# Patient Record
Sex: FEMALE | Race: BLACK OR AFRICAN AMERICAN | Employment: UNEMPLOYED | URBAN - METROPOLITAN AREA
[De-identification: names, ages, dates, MRNs, and addresses within clinical notes are randomized per-mention and may not be internally consistent; named-entity substitution may affect disease eponyms.]

---

## 2022-09-21 ENCOUNTER — OFFICE VISIT (OUTPATIENT)
Dept: FAMILY MEDICINE CLINIC | Age: 32
End: 2022-09-21
Payer: COMMERCIAL

## 2022-09-21 ENCOUNTER — HOSPITAL ENCOUNTER (OUTPATIENT)
Facility: CLINIC | Age: 32
Discharge: HOME | End: 2022-09-21
Payer: COMMERCIAL

## 2022-09-21 VITALS — HEART RATE: 80 BPM | HEIGHT: 65 IN | WEIGHT: 180 LBS | OXYGEN SATURATION: 95 % | BODY MASS INDEX: 29.99 KG/M2

## 2022-09-21 DIAGNOSIS — R10.2 PELVIC CRAMPING: ICD-10-CM

## 2022-09-21 DIAGNOSIS — Z32.00 POSSIBLE PREGNANCY, NOT CONFIRMED: ICD-10-CM

## 2022-09-21 DIAGNOSIS — Z76.89 ENCOUNTER TO ESTABLISH CARE: Primary | ICD-10-CM

## 2022-09-21 DIAGNOSIS — N91.2 AMENORRHEA: ICD-10-CM

## 2022-09-21 LAB
HCG QUALITATIVE: NEGATIVE
HCG QUANTITATIVE: <1 MIU/ML

## 2022-09-21 PROCEDURE — 36415 COLL VENOUS BLD VENIPUNCTURE: CPT

## 2022-09-21 PROCEDURE — 84702 CHORIONIC GONADOTROPIN TEST: CPT

## 2022-09-21 PROCEDURE — 84703 CHORIONIC GONADOTROPIN ASSAY: CPT

## 2022-09-21 PROCEDURE — 99204 OFFICE O/P NEW MOD 45 MIN: CPT

## 2022-09-21 RX ORDER — PNV NO.95/FERROUS FUM/FOLIC AC 28MG-0.8MG
1 TABLET ORAL DAILY
Qty: 90 TABLET | Refills: 2 | Status: SHIPPED | OUTPATIENT
Start: 2022-09-21 | End: 2022-12-20

## 2022-09-21 RX ORDER — IBUPROFEN 800 MG/1
800 TABLET ORAL EVERY 6 HOURS PRN
COMMUNITY

## 2022-09-21 ASSESSMENT — PATIENT HEALTH QUESTIONNAIRE - PHQ9
SUM OF ALL RESPONSES TO PHQ9 QUESTIONS 1 & 2: 0
SUM OF ALL RESPONSES TO PHQ QUESTIONS 1-9: 0
SUM OF ALL RESPONSES TO PHQ QUESTIONS 1-9: 0
2. FEELING DOWN, DEPRESSED OR HOPELESS: 0
1. LITTLE INTEREST OR PLEASURE IN DOING THINGS: 0
SUM OF ALL RESPONSES TO PHQ QUESTIONS 1-9: 0
SUM OF ALL RESPONSES TO PHQ QUESTIONS 1-9: 0

## 2022-09-21 ASSESSMENT — ENCOUNTER SYMPTOMS
EYE ITCHING: 0
COUGH: 0
SHORTNESS OF BREATH: 0
EYE DISCHARGE: 0
NAUSEA: 1

## 2022-09-21 ASSESSMENT — ANXIETY QUESTIONNAIRES
7. FEELING AFRAID AS IF SOMETHING AWFUL MIGHT HAPPEN: 0
5. BEING SO RESTLESS THAT IT IS HARD TO SIT STILL: 0
4. TROUBLE RELAXING: 0
GAD7 TOTAL SCORE: 0
3. WORRYING TOO MUCH ABOUT DIFFERENT THINGS: 0
6. BECOMING EASILY ANNOYED OR IRRITABLE: 0
2. NOT BEING ABLE TO STOP OR CONTROL WORRYING: 0
1. FEELING NERVOUS, ANXIOUS, OR ON EDGE: 0

## 2022-09-21 NOTE — PROGRESS NOTES
Nick Castro (:  1990) is a 34 y.o. female,New patient, here for evaluation of the following chief complaint(s):  Establish Care and Dysmenorrhea         ASSESSMENT/PLAN:  1. Encounter to establish care  2. Amenorrhea  -     POCT urine pregnancy  -     hCG, Quantitative, Pregnancy; Future  -     External Referral To Ob-Gyn  -     US PELVIS COMPLETE; Future  -     hCG, Serum, Qualitative; Future  3. Possible pregnancy, not confirmed  -     POCT urine pregnancy  -     hCG, Quantitative, Pregnancy; Future  -     External Referral To Ob-Gyn  -     Prenatal Vit-Fe Fumarate-FA (PRENATAL VITAMIN) 27-0.8 MG TABS; Take 1 tablet by mouth daily, Disp-90 tablet, R-2Normal  -     US PELVIS COMPLETE; Future  -     hCG, Serum, Qualitative; Future  4. Pelvic cramping  -     POCT urine pregnancy  -     hCG, Quantitative, Pregnancy; Future  -     External Referral To Ob-Gyn  -     US PELVIS COMPLETE; Future  -     hCG, Serum, Qualitative; Future    POC preg negative in office. Complete labs today. Encouraged stat US today. Patient unable to complete pelvic US stat due to evening plans. Patient aware that if any increase in abdominal pain or abrupt heavy bleeding to report to ER. Patient to call Dr Pino Monae ([facility] referral in [place] due to patient location) and establish ASAP. Complete labs as ordered. Return in about 2 months (around 2022) for physical .         Subjective   SUBJECTIVE/OBJECTIVE:  Patient here to establish care. She recently moved to the area from [facility] after her spouse and her did online dating, and are now . She thinks she may be pregnant. LMP: . She has taken several OTC test.  Some which have been positive and others that have been negative. She was having some breast tenderness and nausea. The breast tenderness has subsided, but the nausea is still persistent and she feels \"crampy\". She has never been pregnant before. She has not had any spotting.   She was not on any OCP or BC. This would be a planned and welcomed pregnancy. The last +test she had was Aug 3rd. Most recent at home was negative 2 weeks ago. She did have a PAP in June in [facility], that was normal. She is in a monogamous heterosexual relationship with her . She denies any previous medical history. States she has been taking a prenatal OTC. Review of Systems   Constitutional:  Negative for activity change, fatigue and fever. HENT:  Negative for congestion and dental problem. Eyes:  Negative for discharge and itching. Respiratory:  Negative for cough and shortness of breath. Cardiovascular:  Negative for chest pain and palpitations. Gastrointestinal:  Positive for nausea. Endocrine: Negative for cold intolerance and heat intolerance. Genitourinary:  Positive for pelvic pain (\"crampy\"). Negative for dysuria and frequency. Skin:  Negative for rash and wound. Neurological:  Negative for dizziness and headaches. Psychiatric/Behavioral:  Negative for sleep disturbance. The patient is not nervous/anxious. Objective   Physical Exam  Constitutional:       General: She is not in acute distress. Appearance: Normal appearance. She is not ill-appearing, toxic-appearing or diaphoretic. Cardiovascular:      Rate and Rhythm: Normal rate and regular rhythm. Heart sounds: Normal heart sounds. No murmur heard. Pulmonary:      Effort: Pulmonary effort is normal.      Breath sounds: Normal breath sounds. Abdominal:      General: Abdomen is flat. Bowel sounds are normal. There is no distension. Palpations: Abdomen is soft. Tenderness: There is abdominal tenderness in the right lower quadrant. There is no guarding or rebound. Negative signs include Stauffer's sign, Rovsing's sign, McBurney's sign and psoas sign. Skin:     General: Skin is warm and dry. Neurological:      Mental Status: She is alert and oriented to person, place, and time.

## 2022-09-28 ENCOUNTER — HOSPITAL ENCOUNTER (OUTPATIENT)
Dept: ULTRASOUND IMAGING | Facility: CLINIC | Age: 32
Discharge: HOME | End: 2022-09-30
Payer: COMMERCIAL

## 2022-09-28 DIAGNOSIS — Z32.00 POSSIBLE PREGNANCY, NOT CONFIRMED: ICD-10-CM

## 2022-09-28 DIAGNOSIS — R10.2 PELVIC CRAMPING: ICD-10-CM

## 2022-09-28 DIAGNOSIS — N91.2 AMENORRHEA: ICD-10-CM

## 2022-09-28 PROCEDURE — 76856 US EXAM PELVIC COMPLETE: CPT

## 2023-01-16 ENCOUNTER — TELEPHONE (OUTPATIENT)
Dept: FAMILY MEDICINE CLINIC | Age: 33
End: 2023-01-16

## 2023-01-16 NOTE — TELEPHONE ENCOUNTER
Pt wanting providers advise -  Please advise on a OB ref for pt who lives in [facility]. Or is it okay for pt to stick with current OB?    ----- Message from Tj Santos sent at 1/12/2023  4:25 PM EST -----  Subject: Message to Provider    QUESTIONS  Information for Provider? Pt was referred to an OBGYN and has an appt with   her on 02/10/23. Since scheduling, she has found out that she is pregnant. Was calling to get a referral to a [facility] or is it better for her to keep her appointment at [address] [place] [address]. Please call patient back to advise. Her primary concern is her insurance. ---------------------------------------------------------------------------  --------------  Shannan Loving [facility]  [phone]; OK to leave message on voicemail  ---------------------------------------------------------------------------  --------------  SCRIPT ANSWERS  Relationship to Patient?  Self

## 2023-01-16 NOTE — TELEPHONE ENCOUNTER
----- Message from [employer] sent at 1/12/2023  4:25 PM EST -----  Subject: Message to Provider    QUESTIONS  Information for Provider? Pt was referred to an OBGYN and has an appt with   her on 02/10/23. Since scheduling, she has found out that she is pregnant. Was calling to get a referral to a [facility] or is it better for her to   keep her appointment at [facility]. Please call patient back to advise. Her   primary concern is her insurance. ---------------------------------------------------------------------------  --------------  Shyanne Hernandez [facility] [place] [place]  [phone]; OK to leave message on voicemail  ---------------------------------------------------------------------------  --------------  SCRIPT ANSWERS  Relationship to Patient?  Self

## 2023-01-16 NOTE — TELEPHONE ENCOUNTER
----- Message from Steff Ashelías sent at 1/12/2023  4:25 PM EST -----  Subject: Message to Provider    QUESTIONS  Information for Provider? Pt was referred to an OBGYN and has an appt with   her on 02/10/23. Since scheduling, she has found out that she is pregnant. Was calling to get a referral to a [facility] or is it better for her to   keep her appointment at [facility]. Please call patient back to advise. Her   primary concern is her insurance. ---------------------------------------------------------------------------  --------------  Tavares Mays [facility]  [phone]; OK to leave message on voicemail  ---------------------------------------------------------------------------  --------------  SCRIPT ANSWERS  Relationship to Patient?  Self

## 2023-01-17 NOTE — TELEPHONE ENCOUNTER
It is fine for patient to stay within [address] system. Unfortunately [facility] close [facility] her. For easy accessibility and close follow ups required throughout pregnancy, it would be best to have a provider who is close to her home.

## 2025-04-03 ENCOUNTER — TELEPHONE (OUTPATIENT)
Dept: FAMILY MEDICINE CLINIC | Age: 35
End: 2025-04-03